# Patient Record
Sex: MALE | Race: BLACK OR AFRICAN AMERICAN | NOT HISPANIC OR LATINO
[De-identification: names, ages, dates, MRNs, and addresses within clinical notes are randomized per-mention and may not be internally consistent; named-entity substitution may affect disease eponyms.]

---
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
## 2020-09-24 ENCOUNTER — APPOINTMENT (OUTPATIENT)
Dept: DERMATOLOGY | Facility: CLINIC | Age: 20
End: 2020-09-24

## 2020-11-15 ENCOUNTER — RESULT REVIEW (OUTPATIENT)
Age: 20
End: 2020-11-15

## 2020-11-16 ENCOUNTER — APPOINTMENT (OUTPATIENT)
Dept: DERMATOLOGY | Facility: CLINIC | Age: 20
End: 2020-11-16
Payer: MEDICAID

## 2020-11-16 PROCEDURE — 11102 TANGNTL BX SKIN SINGLE LES: CPT | Mod: 59

## 2020-11-16 PROCEDURE — 17110 DESTRUCTION B9 LES UP TO 14: CPT

## 2021-12-07 ENCOUNTER — EMERGENCY (EMERGENCY)
Facility: HOSPITAL | Age: 21
LOS: 1 days | Discharge: HOME | End: 2021-12-07
Attending: EMERGENCY MEDICINE
Payer: MEDICAID

## 2021-12-07 VITALS
DIASTOLIC BLOOD PRESSURE: 65 MMHG | HEART RATE: 74 BPM | HEIGHT: 69 IN | TEMPERATURE: 98 F | RESPIRATION RATE: 19 BRPM | SYSTOLIC BLOOD PRESSURE: 147 MMHG | WEIGHT: 160.06 LBS | OXYGEN SATURATION: 98 %

## 2021-12-07 PROCEDURE — 29505 APPLICATION LONG LEG SPLINT: CPT

## 2021-12-07 PROCEDURE — 99283 EMERGENCY DEPT VISIT LOW MDM: CPT | Mod: 25

## 2021-12-07 PROCEDURE — 29515 APPLICATION SHORT LEG SPLINT: CPT | Mod: LT

## 2021-12-07 PROCEDURE — 73610 X-RAY EXAM OF ANKLE: CPT

## 2021-12-07 PROCEDURE — 99284 EMERGENCY DEPT VISIT MOD MDM: CPT | Mod: 25

## 2021-12-07 PROCEDURE — 73610 X-RAY EXAM OF ANKLE: CPT | Mod: 26,LT

## 2021-12-07 RX ORDER — IBUPROFEN 200 MG
800 TABLET ORAL ONCE
Refills: 0 | Status: COMPLETED | OUTPATIENT
Start: 2021-12-07 | End: 2021-12-07

## 2021-12-07 RX ADMIN — Medication 800 MILLIGRAM(S): at 12:01

## 2021-12-07 NOTE — ED PROVIDER NOTE - ATTENDING CONTRIBUTION TO CARE
I personally saw the patient with the resident, and completed the key components of the history and physical exam. I then discussed the management plan with the resident.    20 y/o M of asthma presents with left ankle pain s/p fall after he jumped a fence today in a rush to go to work, landing on his left ankle, was able to bear weight, complaining of swelling, denies numbness/weakness/discoloration. No head injury, no LOC.    AP - NAD, well appearing, + edema over left medial and lateral malleolus, decreased ROM due to pain, DP pulses 2+ symmetrically, cap refill < 2 seconds, sensation intact.    XR shows fracture, will splint, crutches, pain control, discussed nikhil SR follow up.

## 2021-12-07 NOTE — ED PROVIDER NOTE - NS ED ROS FT
General: Denies fever, chills  HEENT: Denies sensory changes, sore throat  Neck: Denies neck pain, neck stiffness  Resp: Denies coughing, SOB  Cardiovascular: Denies CP, palpitations, LE edema  GI: Denies nausea, vomiting, abdominal pain, diarrhea, constipation, blood in stool  : Denies dysuria, hematuria, frequency, incontinence  MSK: Denies back pain, +ankle pain   Neuro: Denies HA, dizziness, numbness, weakness  Skin: Denies rashes.

## 2021-12-07 NOTE — ED PROVIDER NOTE - PATIENT PORTAL LINK FT
You can access the FollowMyHealth Patient Portal offered by [facility] by registering at the following website: [email or web address]. By joining [employer]’s FollowMyHealth portal, you will also be able to view your health information using other applications (apps) compatible with our system.

## 2021-12-07 NOTE — ED PROVIDER NOTE - PHYSICAL EXAMINATION
General: Well appearing male in no acute distress  HEENT: Normocephalic, atraumatic. Moist mucous membranes. Oropharynx clear. No lymphadenopathy.  Eyes: No scleral icterus. EOMI. MONICA.  Neck:. Soft and supple. Full ROM without pain. No midline tenderness  Cardiac: Regular rate and regular rhythm. No murmurs, rubs, gallops. Peripheral pulses 2+ and symmetric. No LE edema.  Resp: Lungs CTAB. Speaking in full sentences. No wheezes, rales or rhonchi.  Abd: Soft, non-tender, non-distended. No guarding or rebound. No scars, masses, or lesions.  Back: Spine midline and non-tender. No CVA tenderness.    Skin: No rashes, abrasions, or lacerations.  Neuro: AO x 3. Moves all extremities symmetrically. Motor strength and sensation grossly intact.  MSK: +edema over medial/lateral malleolus, tender. DP2+, sensation intact, able to plantar/dorsiflex.

## 2021-12-07 NOTE — ED ADULT TRIAGE NOTE - CHIEF COMPLAINT QUOTE
Pt. c/o left ankle pain after hopping over fence on the way to work today.  No deformity noted.  Pt. ambulating into ED with steady gait

## 2021-12-07 NOTE — ED PROVIDER NOTE - PROGRESS NOTE DETAILS
Lr: posterior long splint placed. neurovascularly intact before and after splint placed. crutches given.

## 2021-12-07 NOTE — ED PROVIDER NOTE - CLINICAL SUMMARY MEDICAL DECISION MAKING FREE TEXT BOX
22 y/o M hx of asthma presents w/ L ankle pain s/p fall. states that he jumped a fence today in a rush to go to work and landed on his L ankle.  +edema over medial/lateral malleolus, tender. DP2+, sensation intact, able to plantar/dorsiflex. able to bear weight   r/o ankle/malleolus fracture -xray  pain control

## 2021-12-07 NOTE — ED PROVIDER NOTE - OBJECTIVE STATEMENT
22 y/o M hx of asthma presents w/ L ankle pain s/p fall. states that he jumped a fence today in a rush to go to work and landed on his L ankle. states he was able to bear weight. Endorsing sensation in the left lower leg. denies numbness/tingling. denies striking head/neck. not on blood thinners. denies chest pain/shortness of breath.

## 2021-12-07 NOTE — ED PROVIDER NOTE - CARE PROVIDER_API CALL
Smith Lorenzo)  Orthopaedic Surgery  [address] [place] [address]  Phone: [phone]  Fax: [phone]  Follow Up Time: 4-6 Days

## 2021-12-08 DIAGNOSIS — M79.606 PAIN IN LEG, UNSPECIFIED: ICD-10-CM

## 2021-12-13 ENCOUNTER — TRANSCRIPTION ENCOUNTER (OUTPATIENT)
Age: 21
End: 2021-12-13

## 2021-12-13 ENCOUNTER — APPOINTMENT (OUTPATIENT)
Dept: ORTHOPEDIC SURGERY | Facility: CLINIC | Age: 21
End: 2021-12-13
Payer: MEDICAID

## 2021-12-13 VITALS
HEIGHT: 70 IN | HEART RATE: 77 BPM | DIASTOLIC BLOOD PRESSURE: 68 MMHG | WEIGHT: 157 LBS | SYSTOLIC BLOOD PRESSURE: 130 MMHG | BODY MASS INDEX: 22.48 KG/M2

## 2021-12-13 DIAGNOSIS — Z78.9 OTHER SPECIFIED HEALTH STATUS: ICD-10-CM

## 2021-12-13 DIAGNOSIS — S82.822A TORUS FRACTURE OF LOWER END OF LEFT FIBULA, INITIAL ENCOUNTER FOR CLOSED FRACTURE: ICD-10-CM

## 2021-12-13 DIAGNOSIS — Z82.49 FAMILY HISTORY OF ISCHEMIC HEART DISEASE AND OTHER DISEASES OF THE CIRCULATORY SYSTEM: ICD-10-CM

## 2021-12-13 DIAGNOSIS — Z84.1 FAMILY HISTORY OF DISORDERS OF KIDNEY AND URETER: ICD-10-CM

## 2021-12-13 DIAGNOSIS — Z87.09 PERSONAL HISTORY OF OTHER DISEASES OF THE RESPIRATORY SYSTEM: ICD-10-CM

## 2021-12-13 PROCEDURE — 99203 OFFICE O/P NEW LOW 30 MIN: CPT | Mod: 57

## 2021-12-13 PROCEDURE — 27786 TREATMENT OF ANKLE FRACTURE: CPT | Mod: LT

## 2021-12-13 NOTE — PHYSICAL EXAM
[de-identified] : Physical Exam:\par General: Well appearing, no acute distress, A&O\par Neurologic: A&Ox3, No focal deficits\par Head: NCAT without abrasions, lacerations, or ecchymosis to head, face, or scalp\par Respiratory: Equal chest wall expansion bilaterally, no accessory muscle use\par Lymphatic: No lymphadenopathy palpated\par Skin: Warm and dry\par Psychiatric: Normal mood and affect\par \par LLE:\par SILT s/s/sp/dp/t\par Fires EHL/FHL/GS/TA\par 2+ DP/PT pulse\par brisk capillary refill\par Positive tenderness to palpation over the distal fibula\par Moderate soft tissue swelling [de-identified] : Plain films of the left ankle were reviewed in the emergency department.  There is a minimally displaced torus distal fibula fracture

## 2021-12-13 NOTE — DISCUSSION/SUMMARY
[de-identified] : The patient presents today with clinical exam findings and radiographic imaging consistent with a minimally displaced distal fibula fracture. Based on the fracture pattern and the ankle stability, it does not require surgical intervention. The ankle mortise appears quite stable and as such should be able to be treated nonoperatively. We will allow the patient to weight-bear as tolerated in a cam walker boot. The patient should come back in one month for repeat x-rays to evaluate for bony healing. At that time we will likely advance to an ankle brace for rotational support for one additional month. \par \par The patient was given the opportunity to ask questions and all questions were answered to their satisfaction.\par \par The question of when to drive is impossible to generalize to everyone because it is largely dependent on the individual.  Importantly, doctors do not have a license with the DMV to "clear you" or "release you" to return to driving.  There are 3 primary criteria that must be met.  You need to be off of narcotic pain medicines (otherwise you are driving under the influence).  You need to be able to get in and out of the 's seat comfortably.  And you must have regained your normal reflexes / strength.  Also, return to driving depends partly on what side had surgery (ie. Right leg operates the pedals; people with Left side surgery can generally get back to driving much sooner unless you have a clutch).  The average time to return to driving is around 2 weeks after you return to normal walking for the right side and usually sooner for the left.  We recommend 'testing' yourself with another licensed  in an empty parking lot or quiet street first in order to check your reflexes moving your foot from pedal to pedal.\par \par Smith Lorenzo MD\par Orthopaedic Trauma Surgeon\par [facility]\par [facility] [place]\par Director Orthopaedic Trauma, [facility]\par \par \par \par

## 2021-12-13 NOTE — HISTORY OF PRESENT ILLNESS
[de-identified] : The patient is a pleasant 21-year-old gentleman presents today for evaluation of left ankle pain.  He suffered a twisting injury to the ankle and was seen in the ER.  X-rays were obtained and a distal fibula fracture was diagnosed.  He comes in today for follow-up evaluation.  He is ambulating with crutches.  The patient states the pain is made worse with activity and relieved with rest.  Dull, 4 out of 10 [Bending] : worsened by bending [Lifting] : worsened by lifting [Weight Bearing] : worsened by weight bearing [Recumbency] : relieved by recumbency [Rest] : relieved by rest

## 2022-01-11 ENCOUNTER — APPOINTMENT (OUTPATIENT)
Dept: ORTHOPEDIC SURGERY | Facility: CLINIC | Age: 22
End: 2022-01-11
Payer: MEDICAID

## 2022-01-11 VITALS
BODY MASS INDEX: 22.19 KG/M2 | DIASTOLIC BLOOD PRESSURE: 76 MMHG | TEMPERATURE: 98.1 F | SYSTOLIC BLOOD PRESSURE: 134 MMHG | HEIGHT: 70 IN | HEART RATE: 70 BPM | WEIGHT: 155 LBS

## 2022-01-11 DIAGNOSIS — M25.572 PAIN IN LEFT ANKLE AND JOINTS OF LEFT FOOT: ICD-10-CM

## 2022-01-11 PROCEDURE — 29540 STRAPPING ANKLE &/FOOT: CPT | Mod: 58,LT

## 2022-01-11 PROCEDURE — 73610 X-RAY EXAM OF ANKLE: CPT | Mod: LT

## 2022-01-11 PROCEDURE — 99024 POSTOP FOLLOW-UP VISIT: CPT

## 2022-01-11 NOTE — PHYSICAL EXAM
[de-identified] : Physical Exam:\par General: Well appearing, no acute distress, A&O\par Neurologic: A&Ox3, No focal deficits\par Head: NCAT without abrasions, lacerations, or ecchymosis to head, face, or scalp\par Respiratory: Equal chest wall expansion bilaterally, no accessory muscle use\par Lymphatic: No lymphadenopathy palpated\par Skin: Warm and dry\par Psychiatric: Normal mood and affect\par \par LLE:\par SILT s/s/sp/dp/t\par Fires EHL/FHL/GS/TA\par 2+ DP/PT pulse\par brisk capillary refill\par Positive tenderness to palpation over the distal fibula\par Moderate soft tissue swelling [de-identified] : Plain films of the left ankle were obtained today and compared to previous films from the emergency department.  There is a minimally displaced torus distal fibula fracture with interval healing

## 2022-01-11 NOTE — HISTORY OF PRESENT ILLNESS
[de-identified] : The patient is a pleasant 21-year-old gentleman presents today for follow-up evaluation of left ankle pain.  He suffered a twisting injury to the ankle and was seen in the ER.  X-rays were obtained and a distal fibula fracture was diagnosed.  H  He is ambulating with his cam boot.  The patient states the pain is made worse with activity and relieved with rest.  Dull, 2 out of 10, much improved from last visit.  He feels he would like to return to work. [Bending] : worsened by bending [Lifting] : worsened by lifting [Weight Bearing] : worsened by weight bearing [Recumbency] : relieved by recumbency [Rest] : relieved by rest

## 2022-01-11 NOTE — DISCUSSION/SUMMARY
[de-identified] : The patient presents today with clinical exam findings and radiographic imaging consistent with a minimally displaced distal fibula fracture. Based on the fracture pattern and the ankle stability, it does not require surgical intervention. The ankle mortise appears quite stable and as such should be able to be treated nonoperatively. We will allow the patient to weight-bear as tolerated in an ankle brace for rotational support for one additional month.  He may self discontinue the brace and follow-up as needed.  I have no restrictions for him and he can slowly return to all activities as tolerated.\par \par The patient was given the opportunity to ask questions and all questions were answered to their satisfaction.\par \par The question of when to drive is impossible to generalize to everyone because it is largely dependent on the individual.  Importantly, doctors do not have a license with the DMV to "clear you" or "release you" to return to driving.  There are 3 primary criteria that must be met.  You need to be off of narcotic pain medicines (otherwise you are driving under the influence).  You need to be able to get in and out of the 's seat comfortably.  And you must have regained your normal reflexes / strength.  Also, return to driving depends partly on what side had surgery (ie. Right leg operates the pedals; people with Left side surgery can generally get back to driving much sooner unless you have a clutch).  The average time to return to driving is around 2 weeks after you return to normal walking for the right side and usually sooner for the left.  We recommend 'testing' yourself with another licensed  in an empty parking lot or quiet street first in order to check your reflexes moving your foot from pedal to pedal.\par \par Smith Lorenzo MD\par Orthopaedic Trauma Surgeon\par [facility]\par [facility] [place]\par Director Orthopaedic Trauma, [facility]\par \par \par \par

## 2022-12-24 ENCOUNTER — EMERGENCY (EMERGENCY)
Facility: HOSPITAL | Age: 22
LOS: 1 days | Discharge: HOME | End: 2022-12-24
Attending: EMERGENCY MEDICINE
Payer: MEDICAID

## 2022-12-24 VITALS
WEIGHT: 160.06 LBS | TEMPERATURE: 98 F | SYSTOLIC BLOOD PRESSURE: 134 MMHG | RESPIRATION RATE: 18 BRPM | HEIGHT: 69 IN | HEART RATE: 82 BPM | DIASTOLIC BLOOD PRESSURE: 75 MMHG | OXYGEN SATURATION: 97 %

## 2022-12-24 LAB
HETEROPH AB TITR SER AGGL: NEGATIVE — SIGNIFICANT CHANGE UP
S PYO DNA THROAT QL NAA+PROBE: SIGNIFICANT CHANGE UP

## 2022-12-24 PROCEDURE — 36415 COLL VENOUS BLD VENIPUNCTURE: CPT

## 2022-12-24 PROCEDURE — 99284 EMERGENCY DEPT VISIT MOD MDM: CPT

## 2022-12-24 PROCEDURE — 87798 DETECT AGENT NOS DNA AMP: CPT

## 2022-12-24 PROCEDURE — 99283 EMERGENCY DEPT VISIT LOW MDM: CPT

## 2022-12-24 PROCEDURE — 86308 HETEROPHILE ANTIBODY SCREEN: CPT

## 2022-12-24 PROCEDURE — 96372 THER/PROPH/DIAG INJ SC/IM: CPT

## 2022-12-24 PROCEDURE — 87651 STREP A DNA AMP PROBE: CPT

## 2022-12-24 RX ORDER — ACETAMINOPHEN 500 MG
650 TABLET ORAL ONCE
Refills: 0 | Status: COMPLETED | OUTPATIENT
Start: 2022-12-24 | End: 2022-12-24

## 2022-12-24 RX ORDER — IBUPROFEN 200 MG
600 TABLET ORAL ONCE
Refills: 0 | Status: COMPLETED | OUTPATIENT
Start: 2022-12-24 | End: 2022-12-24

## 2022-12-24 RX ORDER — DEXAMETHASONE 0.5 MG/5ML
10 ELIXIR ORAL ONCE
Refills: 0 | Status: COMPLETED | OUTPATIENT
Start: 2022-12-24 | End: 2022-12-24

## 2022-12-24 RX ADMIN — Medication 10 MILLIGRAM(S): at 22:47

## 2022-12-24 RX ADMIN — Medication 600 MILLIGRAM(S): at 22:47

## 2022-12-24 RX ADMIN — Medication 650 MILLIGRAM(S): at 22:47

## 2022-12-24 NOTE — ED PROVIDER NOTE - PHYSICAL EXAMINATION
Gen: No acute distress, non toxic  HEENT: Mucous membranes moist, pink conjunctivae, EOMI. PERRL. Airway patent, uvula midline. Erythematous posterior pharynx w/ tonsillar exudates on the left tonsil.   Neck: FROM. No neck stiffness. <1cm nontender ant cervical LAD. +nontender posterior cervical LAD.   CV: RRR, nl s1/s2.  Resp: CTAB, normal rate and effort. No wheezes, rhonchi or crackles.   GI: Abdomen soft, NT, ND. No rebound, no guarding  Neuro: A&O x4, MAEx4. 5/5 str ext x 4. Sensation intact, symmetric throughout. No FND's. Gait intact. CN 2-12 intact.   MSK: No spine or joint ttp. FROM UE and LE b/l.   Skin: No rashes. intact and perfused. cap refill <2sec  Vascular: Radial and dorsalis pedal pulses 2+ b/l. No LE edema.

## 2022-12-24 NOTE — ED PROVIDER NOTE - NS ED ROS FT
Gen: denies fever, chills  Skin: denies rashes  HEENT: +throat pain. denies visual changes, ear pain, nasal congestion  Respiratory: denies SOB, cough  Cardiovascular: denies chest pain, palpitations  GI: denies abdominal pain, n/v/d  : denies dysuria  MSK: denies joint swelling/pain, back pain, neck pain  Neuro: denies headache, dizziness, LOC, weakness, numbness

## 2022-12-24 NOTE — ED PROVIDER NOTE - OBJECTIVE STATEMENT
21 yo male with no pmhx present with sore throat since yesterday. Pt reports since yesterday, he noticed his throat felt scratchy and then progressively hurt more throughout the day. States every time he swallows, he experiences pain but has been eating/drinking fluids nl. Denies muffled voice, drooling, inability to tolerate secretions. Denies fever, chills, body aches, dizziness, LOC, vision changes, ear pain, nasal congestion, CP, palpitations, SOB, abd pain, N/V/C/D, urinary symptoms, paresthesias in the extremities, rashes. Denies smoking cigarettes but smokes marijuana. 22 year old male with no pmhx present with sore throat since yesterday. Pt reports since yesterday, he noticed his throat felt scratchy and then progressively hurt more throughout the day. States every time he swallows, he experiences pain but has been eating/drinking fluids nl. Denies muffled voice, drooling, inability to tolerate secretions. Denies fever, chills, body aches, dizziness, LOC, vision changes, ear pain, nasal congestion, CP, palpitations, SOB, abd pain, N/V/C/D, urinary symptoms, paresthesias in the extremities, rashes. Denies smoking cigarettes but smokes marijuana.

## 2022-12-24 NOTE — ED PROVIDER NOTE - PATIENT PORTAL LINK FT
You can access the FollowMyHealth Patient Portal offered by [facility] by registering at the following website: [email or web address]. By joining [employer]’s FollowMyHealth portal, you will also be able to view your health information using other applications (apps) compatible with our system.

## 2022-12-24 NOTE — ED PROVIDER NOTE - CLINICAL SUMMARY MEDICAL DECISION MAKING FREE TEXT BOX
21 yo male with no pmhx present with sore throat since yesterday. Tolerating PO well. Strep and mono test. meds given. Strict return precautions explained.

## 2023-07-17 ENCOUNTER — NON-APPOINTMENT (OUTPATIENT)
Age: 23
End: 2023-07-17

## 2023-07-20 ENCOUNTER — EMERGENCY (EMERGENCY)
Facility: HOSPITAL | Age: 23
LOS: 1 days | Discharge: HOME | End: 2023-07-20
Attending: EMERGENCY MEDICINE
Payer: MEDICAID

## 2023-07-20 VITALS
WEIGHT: 169.09 LBS | TEMPERATURE: 98 F | HEART RATE: 111 BPM | OXYGEN SATURATION: 99 % | RESPIRATION RATE: 20 BRPM | DIASTOLIC BLOOD PRESSURE: 71 MMHG | SYSTOLIC BLOOD PRESSURE: 121 MMHG | HEIGHT: 69 IN

## 2023-07-20 PROCEDURE — 99283 EMERGENCY DEPT VISIT LOW MDM: CPT

## 2023-07-20 RX ORDER — FLUORESCEIN SODIUM 9 MG
1 STRIP OPHTHALMIC (EYE) ONCE
Refills: 0 | Status: DISCONTINUED | OUTPATIENT
Start: 2023-07-20 | End: 2023-07-28

## 2023-07-20 NOTE — ED PROVIDER NOTE - NS ED ATTENDING STATEMENT MOD
This was a shared visit with the BRIANA. I reviewed and verified the documentation and independently performed the documented: Attending with

## 2023-07-20 NOTE — ED PROVIDER NOTE - CLINICAL SUMMARY MEDICAL DECISION MAKING FREE TEXT BOX
22yM with no significant pmh presenting with left eye swelling. Patient with subconjunctival hemorrhage of left eye. No recent trauma. No signs of infection. Fluorescein stain without uptake. Counseled patient that subconjunctival hemorrhage may take several weeks to completely resolve. Patient stable to follow-up with primary care provide. Strict return precautions given.

## 2023-07-20 NOTE — ED ADULT NURSE NOTE - NSFALLUNIVINTERV_ED_ALL_ED
Bed/Stretcher in lowest position, wheels locked, appropriate side rails in place/Call bell, personal items and telephone in reach/Instruct patient to call for assistance before getting out of bed/chair/stretcher/Non-slip footwear applied when patient is off stretcher/[place] to call system/Physically safe environment - no spills, clutter or unnecessary equipment/Purposeful proactive rounding/Room/bathroom lighting operational, light cord in reach

## 2023-07-20 NOTE — ED PROVIDER NOTE - NSFOLLOWUPINSTRUCTIONS_ED_ALL_ED_FT
- Please follow-up with your primary care doctor in 2-5 days.   - SEEK IMMEDIATE MEDICAL CARE IF YOU HAVE ANY OF THE FOLLOWING SYMPTOMS: fever, vomiting, stiff neck, loss of vision, problems with speech, muscle weakness, loss of balance, trouble walking, passing out, or confusion.

## 2023-07-20 NOTE — ED ADULT TRIAGE NOTE - CHIEF COMPLAINT QUOTE
Patient presents to ED with c/o left eye swelling that started on Tuesday.  Seen at urgent care yesterday and was told he has pink eye and started on eye drops.  Today the eyelid has increased in swelling.  Denies any visual changes.

## 2023-07-20 NOTE — ED PROVIDER NOTE - OBJECTIVE STATEMENT
22yM with no significant pmh presenting with left eye swelling. Patient denies any trauma to the eye. Reports that he works as a  but does not think anything has flown into his eye recently. Went to urgent care for eye redness and was diagnosed with pink eye. Was given polytrim drops. Today left eyelid swelling worsened. Denies vision changes, injury to eye, contact lens wearing, or recent swimming.

## 2023-07-20 NOTE — ED PROVIDER NOTE - PHYSICAL EXAMINATION
Gen: no acute distress  Head: normocephalic, atraumatic  EENT: EOMI, PERRLA; left subconjunctival hemorrhage at 2-5 o'clock position sparing the limbus; Visual acuity 20/20 both eyes; no corneal abrasion of left eye on fluorescein stain  Lung: no increased work of breathing  MSK: no visible deformities, full range of motion in all 4 extremities  Neuro: A&Ox4; No focal neurologic deficits

## 2023-07-20 NOTE — ED PROVIDER NOTE - ATTENDING CONTRIBUTION TO CARE
L subconjunctival hemorrhage and L upper lid swelling, no mass or head on eyelash no visual change plan warm compresses

## 2023-07-20 NOTE — ED PROVIDER NOTE - WET READ LAUNCH FT
[address]       H+P // CONSULT // OUTPATIENT VISIT // Angy Herrera     Referring Doctor Akbar Zuluaga MD   Encounter Type Followup     CHIEF COMPLAINT     VisitType [] Acute [x] Chronic     Symptom [] None [] CP [] SOB [] Dizzy [] Palps [] Fatigue     Problems CAD s/p CABG 3/15, HTN, CHOL, TOB      HISTORY OF PRESENT ILLNESS     Doing well. Denies cp, sob. Compliant with meds. Continues to smoke. 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 [address] CAD (coronary artery disease); COPD (chronic obstructive pulmonary disease) ([facility] [address] Depression; Diabetes mellitus ([facility] [address] Hearing impaired; Hiatal hernia; Hyperlipidemia; Hypertension; Kidney stone; Malignant melanoma ([facility] [address] Osteoarthritis; Pneumonia; and Stented coronary artery. SurgHx:  has a past surgical history that includes Coronary angioplasty with stent; Hysterectomy (1987); Skin cancer excision (<2000); knee surgery; Stapedes surgery (4628-3529); skin biopsy; Colonoscopy; Endoscopy, colon, diagnostic; and Coronary artery bypass graft (03/17/15). SocHx:   reports that she has been smoking Cigarettes. She has been smoking about 1.00 pack per day. She has never used smokeless tobacco. She reports that she does not drink alcohol or use drugs. FamHx: family history includes Cancer in an other family member; Esophageal Cancer in her son; Seizures in her daughter. Allergies: Pcn [penicillins];  Tetanus toxoids; and Statins   ROS:  [x]Full ROS obtained and negative except as mentioned in HPI     MEDICATIONS      Current Outpatient Prescriptions   Medication Sig Dispense There are no Wet Read(s) to document.

## 2023-07-20 NOTE — ED ADULT NURSE NOTE - OBJECTIVE STATEMENT
Pt comes in complaining of eye redness and swelling. Diagnosed with pink eye at urgent care. Denies blurry vision.

## 2023-07-20 NOTE — ED PROVIDER NOTE - PATIENT PORTAL LINK FT
You can access the FollowMyHealth Patient Portal offered by [facility] by registering at the following website: [email or web address]. By joining [employer]’s FollowMyHealth portal, you will also be able to view your health information using other applications (apps) compatible with our system.

## 2023-07-21 PROBLEM — Z78.9 OTHER SPECIFIED HEALTH STATUS: Chronic | Status: ACTIVE | Noted: 2022-12-24

## 2023-09-22 NOTE — ED PROVIDER NOTE - NSFOLLOWUPINSTRUCTIONS_ED_ALL_ED_FT
- Please follow-up with your primary care doctor in the next 1-2 days.  Please call tomorrow for an appointment.  If you cannot follow-up with your primary care doctor please return to the ED for any urgent issues.  - Take ibuprofen every 6 hours or tylenol every 4 hours as needed for pain. Take medication with food to prevent upset stomach.   - You were given a copy of the tests performed today.  Please bring the results with you and review them with your primary care doctor.  - If you have any worsening of symptoms or any other concerns please return to the ED immediately.  - Please continue taking your home medications as directed.     Pharyngitis    Pharyngitis is inflammation of your pharynx, which is typically caused by a viral or bacterial infection. Pharyngitis can be contagious and may spread from person to person through intimate contact, coughing, sneezing, or sharing personal items and utensils. Symptoms of pharyngitis may include sore throat, fever, headache, or swollen lymph nodes. If you are prescribed antibiotics, make sure you finish them even if you start to feel better. Gargle with salt water as often as every 1-2 hours to soothe your throat. Throat lozenges (if you are not at risk for choking) or sprays may be used to soothe your throat.    SEEK IMMEDIATE MEDICAL CARE IF YOU HAVE ANY OF THE FOLLOWING SYMPTOMS: neck stiffness, drooling, hoarseness or change in voice, inability to swallow liquids, vomiting, or trouble breathing.
independent

## 2023-12-04 ENCOUNTER — EMERGENCY (EMERGENCY)
Facility: HOSPITAL | Age: 23
LOS: 1 days | Discharge: HOME | End: 2023-12-04
Attending: EMERGENCY MEDICINE
Payer: COMMERCIAL

## 2023-12-04 VITALS
RESPIRATION RATE: 18 BRPM | HEART RATE: 71 BPM | WEIGHT: 166.23 LBS | DIASTOLIC BLOOD PRESSURE: 63 MMHG | TEMPERATURE: 99 F | HEIGHT: 69 IN | OXYGEN SATURATION: 97 % | SYSTOLIC BLOOD PRESSURE: 123 MMHG

## 2023-12-04 PROCEDURE — 99282 EMERGENCY DEPT VISIT SF MDM: CPT

## 2023-12-04 PROCEDURE — 99283 EMERGENCY DEPT VISIT LOW MDM: CPT
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
## 2023-12-04 NOTE — ED PROVIDER NOTE - PHYSICAL EXAMINATION
Gen: NAD, AOx3  Head: NCAT  HEENT: EOMI, oral mucosa moist, normal conjunctiva, neck supple  Lung: no respiratory distress  CV:  Normal perfusion  Abd: soft, NTND  MSK: No edema, no visible deformities, FROM shoulder, no bony ttp to hand/fingers/UE  Neuro: No focal neurologic deficits  Skin: No rash   Psych: normal affect

## 2023-12-04 NOTE — ED PROVIDER NOTE - NSFOLLOWUPINSTRUCTIONS_ED_ALL_ED_FT
1. Return to ED for worsening, progressive or any other concerning symptoms   2. Follow up with your primary care doctor in 2-3days   Apply ice to any area that hurts for 15minutes at a time, expect to feel worse tomorrow and the next day, take it easy but do not stay in bed or lay on the couch all day, it will make the pain worse. Continue with normal daily activity and try gentle stretches.   Take motrin 400mg every 6 hours as needed for pain and Take Tylenol up to 1000 mg every 6 hours as needed for pain.

## 2023-12-04 NOTE — ED PROVIDER NOTE - INTERNATIONAL TRAVEL
Anesthesia Evaluation      Patient summary reviewed     Airway   Mallampati: I  Neck ROM: full   Pulmonary - negative ROS    breath sounds clear to auscultation                         Cardiovascular - negative ROS  Exercise tolerance: > or = 10 METS  Rhythm: regular  Rate: normal,         Neuro/Psych - negative ROS     Endo/Other - negative ROS      GI/Hepatic/Renal - negative ROS   (+)   chronic renal disease ARF,           Dental - normal exam                        Anesthesia Plan  Planned anesthetic: general LMA    ASA 1   Induction: intravenous   Anesthetic plan and risks discussed with: patient               No

## 2023-12-04 NOTE — ED PROVIDER NOTE - PATIENT PORTAL LINK FT
You can access the FollowMyHealth Patient Portal offered by [facility] by registering at the following website: [email or web address]. By joining [facility]’s FollowMyHealth portal, you will also be able to view your health information using other applications (apps) compatible with our system. You can access the FollowMyHealth Patient Portal offered by [facility] by registering at the following website: [email or web address]. By joining [facility]’s FollowMyHealth portal, you will also be able to view your health information using other applications (apps) compatible with our system.

## 2024-07-07 NOTE — ED PROVIDER NOTE - NSFOLLOWUPINSTRUCTIONS_ED_ALL_ED_FT
ED Attending Note      Patient : Magdaleno Jacobs Age: 31 Angel Alva MD  07/06/24 0745     followup with orthopedic doctor in next 1-7 days. keep splint on until you are seen by doctor.     Fracture    A fracture is a break in one of your bones. This can occur from a variety of injuries, especially traumatic ones. Symptoms include pain, bruising, or swelling. Do not use the injured limb. If a fracture is in one of the bones below your waist, do not put weight on that limb unless instructed to do so by your healthcare provider. Crutches or a cane may have been provided. A splint or cast may have been applied by your health care provider. Make sure to keep it dry and follow up with an orthopedist as instructed.    SEEK IMMEDIATE MEDICAL CARE IF YOU HAVE ANY OF THE FOLLOWING SYMPTOMS: numbness, tingling, increasing pain, or weakness in any part of the injured limb.

## 2024-12-29 ENCOUNTER — EMERGENCY (EMERGENCY)
Facility: HOSPITAL | Age: 24
LOS: 1 days | Discharge: HOME | End: 2024-12-29
Attending: EMERGENCY MEDICINE
Payer: MEDICAID

## 2024-12-29 VITALS
SYSTOLIC BLOOD PRESSURE: 143 MMHG | HEART RATE: 63 BPM | TEMPERATURE: 99 F | WEIGHT: 161.82 LBS | RESPIRATION RATE: 16 BRPM | OXYGEN SATURATION: 98 % | DIASTOLIC BLOOD PRESSURE: 84 MMHG

## 2024-12-29 RX ORDER — IBUPROFEN 200 MG
600 TABLET ORAL ONCE
Refills: 0 | Status: COMPLETED | OUTPATIENT
Start: 2024-12-29 | End: 2024-12-29

## 2024-12-29 RX ADMIN — Medication 600 MILLIGRAM(S): at 07:24

## 2025-01-09 ENCOUNTER — APPOINTMENT (OUTPATIENT)
Age: 25
End: 2025-01-09
Payer: MEDICAID

## 2025-01-09 VITALS
SYSTOLIC BLOOD PRESSURE: 153 MMHG | BODY MASS INDEX: 22.19 KG/M2 | HEIGHT: 70 IN | WEIGHT: 155 LBS | DIASTOLIC BLOOD PRESSURE: 75 MMHG

## 2025-01-09 DIAGNOSIS — M25.512 PAIN IN LEFT SHOULDER: ICD-10-CM

## 2025-01-09 DIAGNOSIS — M75.50 BURSITIS OF UNSPECIFIED SHOULDER: ICD-10-CM

## 2025-01-09 DIAGNOSIS — M75.90 BURSITIS OF UNSPECIFIED SHOULDER: ICD-10-CM

## 2025-01-09 PROCEDURE — 99205 OFFICE O/P NEW HI 60 MIN: CPT | Mod: 25

## 2025-01-09 PROCEDURE — 73030 X-RAY EXAM OF SHOULDER: CPT | Mod: LT

## 2025-01-09 PROCEDURE — 20610 DRAIN/INJ JOINT/BURSA W/O US: CPT | Mod: LT
